# Patient Record
Sex: MALE | Race: WHITE | Employment: UNEMPLOYED | URBAN - METROPOLITAN AREA
[De-identification: names, ages, dates, MRNs, and addresses within clinical notes are randomized per-mention and may not be internally consistent; named-entity substitution may affect disease eponyms.]

---
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
## 2020-11-17 ENCOUNTER — VIRTUAL VISIT (OUTPATIENT)
Dept: FAMILY MEDICINE CLINIC | Age: 20
End: 2020-11-17
Payer: MEDICAID

## 2020-11-17 DIAGNOSIS — F41.8 SITUATIONAL ANXIETY: ICD-10-CM

## 2020-11-17 DIAGNOSIS — F84.0 AUTISM: Primary | ICD-10-CM

## 2020-11-17 PROCEDURE — 99214 OFFICE O/P EST MOD 30 MIN: CPT | Performed by: NURSE PRACTITIONER

## 2020-11-17 NOTE — PROGRESS NOTES
HISTORY OF PRESENT ILLNESS  Dulce Espinal is a 21 y.o. male seen to establish acer and behavior problems. HPI  Patient is autistic. Since covid he has displayed violent behavior towards items, TVs, walls and cell phones. Per mother happens once a month. Does not hurt anyone though. Patient has been attending a postgraduate program through [employer] [facility] with autistism. But now it has all stopped because of covid. Patient did not like virtual class. Denies fever, chills, SOB, chest pain or abdomen pain. Patient had a recent ED visit and diagnosed with upper respiratory viral infection, ear infection and negative for Covid. Patient has a history of pulmonary stenosis. He has been seen at [facility] and now is transferring to adult care. Per mom he already has a referral for a cardiologist related to his pulmonary stenosis. However, he was being seen at [facility] primary care, but they informed her that they are not taking his insurance anymore. Mom would like to try out a medication to help control these outbursts but does not want him to feel like a \"zombie. \" Patient also has sleep problems, mom says  \"very restless sleep. \"  Advised mother to make contact with [facility]. She said she has recently but they have not contacted her. I will try to make contact for her this week. Review of Systems   Constitutional: Negative for chills and fever. HENT: Positive for congestion. Negative for sinus pain and sore throat. Eyes: Negative. Respiratory: Negative for cough and shortness of breath. Cardiovascular: Negative for chest pain and leg swelling. Gastrointestinal: Negative for abdominal pain, blood in stool, constipation, diarrhea, nausea and vomiting. Genitourinary: Negative. Musculoskeletal: Negative. Neurological: Negative for dizziness and headaches. Endo/Heme/Allergies: Negative. Psychiatric/Behavioral: Negative for depression. Vital Sign Reading Time Taken Comments   Blood Pressure 140/78 11/16/2020 9:12 PM EST     Pulse - -     Temperature 36.3 °C (97.3 °F) 11/16/2020 9:12 PM EST     Respiratory Rate 16 11/16/2020 9:12 PM EST     Oxygen Saturation 98% 11/16/2020 9:12 PM EST     Inhaled Oxygen Concentration - -     Weight 99.6 kg (219 lb 8 oz) 11/16/2020 5:46 PM EST     Height 175.3 cm (5' 9\") 11/16/2020 5:46 PM EST     Body Mass Index 32.41 11/16/2020 5:46 PM EST       Physical Exam  Constitutional:       Appearance: Normal appearance. He is obese. Eyes:      Conjunctiva/sclera: Conjunctivae normal.   Pulmonary:      Effort: Pulmonary effort is normal.   Neurological:      Mental Status: He is oriented to person, place, and time. Psychiatric:         Mood and Affect: Mood normal.         Behavior: Behavior normal.       ASSESSMENT and PLAN    ICD-10-CM ICD-9-CM    1. Autism  F84.0 299.00 REFERRAL TO PSYCHIATRY      escitalopram oxalate (LEXAPRO) 5 mg tablet       Mother has agreed to trial of Lexapro 5 mg daily for a month. Referral to psychiatry and touch base with community services Board for services. Follow-up in 2 weeks. Mother and patient agreed to plan.

## 2020-11-18 RX ORDER — ESCITALOPRAM OXALATE 5 MG/1
5 TABLET ORAL DAILY
Qty: 30 TAB | Refills: 0 | Status: SHIPPED | OUTPATIENT
Start: 2020-11-18 | End: 2020-12-17 | Stop reason: SDUPTHER

## 2020-11-19 ENCOUNTER — TELEPHONE (OUTPATIENT)
Dept: FAMILY MEDICINE CLINIC | Age: 20
End: 2020-11-19

## 2020-11-19 NOTE — TELEPHONE ENCOUNTER
Attempt made to contact patient to give the information for [facility]  ( Dr Jacob Townsend) [address] [place], [place]. [email or web address] Phone: [phone]  Fax: [phone] for patient to contact office to schedule his appointment.  Left message for him to contact office for this information

## 2020-11-23 ENCOUNTER — TELEPHONE (OUTPATIENT)
Dept: FAMILY MEDICINE CLINIC | Age: 20
End: 2020-11-23

## 2020-11-23 NOTE — TELEPHONE ENCOUNTER
Called patient parent(listed on PHI) to update her on the referral that was sent to   [facility]  ( Dr Jennie Haas) [address] [address]. [email or web address] Phone: [phone]  Parent will contact office to schedule an appointment for patient.  Closing encounter

## 2020-12-17 DIAGNOSIS — F41.8 SITUATIONAL ANXIETY: ICD-10-CM

## 2020-12-17 DIAGNOSIS — F84.0 AUTISM: ICD-10-CM

## 2020-12-17 NOTE — TELEPHONE ENCOUNTER
Pt's mother stated his appt with Aldo Warren is on 01/20/2020 and she is requesting a refill until he sees the psych doctor. Please advise.

## 2020-12-18 ENCOUNTER — DOCUMENTATION ONLY (OUTPATIENT)
Dept: FAMILY MEDICINE CLINIC | Age: 20
End: 2020-12-18

## 2020-12-18 RX ORDER — ESCITALOPRAM OXALATE 5 MG/1
5 TABLET ORAL DAILY
Qty: 30 TAB | Refills: 0 | Status: SHIPPED | OUTPATIENT
Start: 2020-12-18 | End: 2021-01-17

## 2020-12-18 NOTE — PROGRESS NOTES
12/18/20 Patient's mother indicated Lexapro has helped. Although she thinks he may need a stronger dose. Will refill but not increase. Sees psychiatrist 1/20/2021.